# Patient Record
Sex: MALE | Race: BLACK OR AFRICAN AMERICAN | Employment: FULL TIME | URBAN - METROPOLITAN AREA
[De-identification: names, ages, dates, MRNs, and addresses within clinical notes are randomized per-mention and may not be internally consistent; named-entity substitution may affect disease eponyms.]

---

## 2022-01-06 ENCOUNTER — HOSPITAL ENCOUNTER (EMERGENCY)
Age: 27
Discharge: HOME | End: 2022-01-06
Payer: MEDICAID

## 2022-01-06 VITALS
SYSTOLIC BLOOD PRESSURE: 127 MMHG | RESPIRATION RATE: 16 BRPM | TEMPERATURE: 98 F | BODY MASS INDEX: 22.35 KG/M2 | HEART RATE: 74 BPM | WEIGHT: 165 LBS | HEIGHT: 72 IN | DIASTOLIC BLOOD PRESSURE: 66 MMHG | OXYGEN SATURATION: 99 %

## 2022-01-06 DIAGNOSIS — K12.0 APHTHOUS ULCER: ICD-10-CM

## 2022-01-06 DIAGNOSIS — K04.7 DENTAL ABSCESS: Primary | ICD-10-CM

## 2022-01-06 PROCEDURE — 99283 EMERGENCY DEPT VISIT LOW MDM: CPT | Performed by: NURSE PRACTITIONER

## 2022-01-06 RX ORDER — HYDROCODONE BITARTRATE AND ACETAMINOPHEN 5; 325 MG/1; MG/1
1-2 TABLET ORAL EVERY 6 HOURS PRN
Qty: 10 TABLET | Refills: 0 | Status: SHIPPED | OUTPATIENT
Start: 2022-01-06 | End: 2022-01-11

## 2022-01-06 RX ORDER — LIDOCAINE HYDROCHLORIDE 20 MG/ML
5 SOLUTION OROPHARYNGEAL
Qty: 100 ML | Refills: 0 | Status: SHIPPED | OUTPATIENT
Start: 2022-01-06

## 2022-01-06 RX ORDER — AMOXICILLIN AND CLAVULANATE POTASSIUM 875; 125 MG/1; MG/1
1 TABLET, FILM COATED ORAL 2 TIMES DAILY
Qty: 20 TABLET | Refills: 0 | Status: SHIPPED | OUTPATIENT
Start: 2022-01-06 | End: 2022-01-16

## 2022-01-07 NOTE — ED PROVIDER NOTES
Patient Seen in: [facility] Emergency Department In [address] [place] [address]      History   Patient presents with:  Dental Problem    Stated Complaint: bump on gum, mouth pain     Subjective:   23-year-old male presents to the emergency department for sore to his bottom g Mental Status: He is alert and oriented to person, place, and time. ED Course   Labs Reviewed - No data to display                MDM        Aphthous ulcer noted to the inner gum on the lower jaw.   Gingival swelling noted concern for early ab

## 2023-03-06 ENCOUNTER — HOSPITAL ENCOUNTER (EMERGENCY)
Age: 28
Discharge: HOME | End: 2023-03-06
Attending: EMERGENCY MEDICINE
Payer: MEDICAID

## 2023-03-06 VITALS
BODY MASS INDEX: 23.1 KG/M2 | TEMPERATURE: 97 F | HEART RATE: 67 BPM | WEIGHT: 165 LBS | HEIGHT: 71 IN | RESPIRATION RATE: 17 BRPM | OXYGEN SATURATION: 98 % | DIASTOLIC BLOOD PRESSURE: 79 MMHG | SYSTOLIC BLOOD PRESSURE: 130 MMHG

## 2023-03-06 DIAGNOSIS — L84 CORN OF TOE: Primary | ICD-10-CM

## 2023-03-06 DIAGNOSIS — Z20.2 CHLAMYDIA CONTACT: ICD-10-CM

## 2023-03-06 PROCEDURE — 87591 N.GONORRHOEAE DNA AMP PROB: CPT | Performed by: NURSE PRACTITIONER

## 2023-03-06 PROCEDURE — 99283 EMERGENCY DEPT VISIT LOW MDM: CPT

## 2023-03-06 PROCEDURE — 87491 CHLMYD TRACH DNA AMP PROBE: CPT | Performed by: NURSE PRACTITIONER

## 2023-03-06 RX ORDER — DOXYCYCLINE HYCLATE 100 MG/1
100 CAPSULE ORAL 2 TIMES DAILY
Qty: 14 CAPSULE | Refills: 0 | Status: SHIPPED | OUTPATIENT
Start: 2023-03-06 | End: 2023-03-13

## 2023-03-06 NOTE — DISCHARGE INSTRUCTIONS
Follow-up with your primary care physician for any other concerns. Over-the-counter corn and callus remover's will help with your toe.   Use as directed, this can take several weeks to resolve  Start oral antibiotics for chlamydia encounter, we will notify you for any other medications needed

## 2023-03-07 LAB
C TRACH DNA SPEC QL NAA+PROBE: NEGATIVE
N GONORRHOEA DNA SPEC QL NAA+PROBE: NEGATIVE

## 2023-06-04 ENCOUNTER — HOSPITAL ENCOUNTER (EMERGENCY)
Age: 28
Discharge: HOME | End: 2023-06-04
Payer: MEDICAID

## 2023-06-04 VITALS
BODY MASS INDEX: 23.03 KG/M2 | SYSTOLIC BLOOD PRESSURE: 134 MMHG | HEIGHT: 72 IN | RESPIRATION RATE: 12 BRPM | OXYGEN SATURATION: 98 % | DIASTOLIC BLOOD PRESSURE: 69 MMHG | WEIGHT: 170 LBS | HEART RATE: 79 BPM | TEMPERATURE: 98 F

## 2023-06-04 DIAGNOSIS — Z20.2 STD EXPOSURE: ICD-10-CM

## 2023-06-04 DIAGNOSIS — R30.0 DYSURIA: Primary | ICD-10-CM

## 2023-06-04 PROCEDURE — 87491 CHLMYD TRACH DNA AMP PROBE: CPT | Performed by: PHYSICIAN ASSISTANT

## 2023-06-04 PROCEDURE — 99283 EMERGENCY DEPT VISIT LOW MDM: CPT

## 2023-06-04 PROCEDURE — 87591 N.GONORRHOEAE DNA AMP PROB: CPT | Performed by: PHYSICIAN ASSISTANT

## 2023-06-04 PROCEDURE — 99284 EMERGENCY DEPT VISIT MOD MDM: CPT

## 2023-06-04 PROCEDURE — 96372 THER/PROPH/DIAG INJ SC/IM: CPT

## 2023-06-04 RX ORDER — DOXYCYCLINE HYCLATE 100 MG/1
100 CAPSULE ORAL 2 TIMES DAILY
Qty: 14 CAPSULE | Refills: 0 | Status: SHIPPED | OUTPATIENT
Start: 2023-06-04 | End: 2023-06-11

## 2023-06-04 RX ORDER — LIDOCAINE HYDROCHLORIDE 10 MG/ML
INJECTION, SOLUTION INFILTRATION; PERINEURAL
Status: COMPLETED
Start: 2023-06-04 | End: 2023-06-04

## 2023-06-04 RX ORDER — CEFTRIAXONE 500 MG/1
500 INJECTION, POWDER, FOR SOLUTION INTRAMUSCULAR; INTRAVENOUS ONCE
Status: COMPLETED | OUTPATIENT
Start: 2023-06-04 | End: 2023-06-04

## 2023-06-04 NOTE — ED INITIAL ASSESSMENT (HPI)
Was informed by partner that they have an std and pt requesting to be treated for it.   Denies urinary sx

## 2023-06-06 LAB
C TRACH DNA SPEC QL NAA+PROBE: POSITIVE
N GONORRHOEA DNA SPEC QL NAA+PROBE: NEGATIVE

## 2023-06-09 NOTE — ED NOTES
Unable to reach patient after multiple attempts. Certified letter to be sent to patient address listed in EMR to inform of results and appropriate treatments and to notify his partners.

## 2024-01-02 ENCOUNTER — HOSPITAL ENCOUNTER (EMERGENCY)
Age: 29
Discharge: HOME | End: 2024-01-02
Attending: EMERGENCY MEDICINE
Payer: MEDICAID

## 2024-01-02 VITALS
SYSTOLIC BLOOD PRESSURE: 122 MMHG | OXYGEN SATURATION: 98 % | BODY MASS INDEX: 25.9 KG/M2 | DIASTOLIC BLOOD PRESSURE: 70 MMHG | TEMPERATURE: 98 F | WEIGHT: 185 LBS | HEART RATE: 67 BPM | HEIGHT: 71 IN | RESPIRATION RATE: 17 BRPM

## 2024-01-02 DIAGNOSIS — Z20.2 EXPOSURE TO SEXUALLY TRANSMITTED DISEASE (STD): Primary | ICD-10-CM

## 2024-01-02 PROCEDURE — 87591 N.GONORRHOEAE DNA AMP PROB: CPT | Performed by: EMERGENCY MEDICINE

## 2024-01-02 PROCEDURE — 87491 CHLMYD TRACH DNA AMP PROBE: CPT | Performed by: EMERGENCY MEDICINE

## 2024-01-02 PROCEDURE — 96372 THER/PROPH/DIAG INJ SC/IM: CPT

## 2024-01-02 PROCEDURE — 99283 EMERGENCY DEPT VISIT LOW MDM: CPT

## 2024-01-02 PROCEDURE — 99284 EMERGENCY DEPT VISIT MOD MDM: CPT

## 2024-01-02 RX ORDER — DOXYCYCLINE HYCLATE 100 MG/1
100 CAPSULE ORAL 2 TIMES DAILY
Qty: 14 CAPSULE | Refills: 0 | Status: SHIPPED | OUTPATIENT
Start: 2024-01-02 | End: 2024-01-09

## 2024-01-02 RX ORDER — CEFTRIAXONE 500 MG/1
500 INJECTION, POWDER, FOR SOLUTION INTRAMUSCULAR; INTRAVENOUS ONCE
Status: COMPLETED | OUTPATIENT
Start: 2024-01-02 | End: 2024-01-02

## 2024-01-02 NOTE — ED PROVIDER NOTES
Patient Seen in: [facility] In [place]      History     Chief Complaint   Patient presents with    Eval-G     Stated Complaint: STD treatment; states his partner is positive    Subjective:   HPI    Patient tells me that starting yesterday, he began having some burning with urination.  Patient states that he had received oral sex from a female prior to this.  He states that that person went and saw provider because of some sort of symptoms and was treated for gonorrhea and chlamydia but patient himself is unsure if she actually tested positive for either or both of these diseases.  Patient denies any blisters or sores of the genitals.  No sores in the mouth or lips.  No fever.  No flank pain.  No abdominal pain.  No rashes.    Objective:   History reviewed. No pertinent past medical history.           Past Surgical History:   Procedure Laterality Date    WRIST FRACTURE SURGERY Bilateral                 Social History     Socioeconomic History    Marital status: Single   Tobacco Use    Smoking status: Some Days     Types: Cigarettes    Smokeless tobacco: Never   Vaping Use    Vaping Use: Never used   Substance and Sexual Activity    Alcohol use: Yes     Comment: socailly    Drug use: Yes     Types: Cannabis     Comment: occasionally              Review of Systems    Positive for stated complaint: STD treatment; states his partner is positive  Other systems are as noted in HPI.  Constitutional and vital signs reviewed.      All other systems reviewed and negative except as noted above.    Physical Exam     ED Triage Vitals [01/02/24 1524]   /70   Pulse 67   Resp 17   Temp 97.5 °F (36.4 °C)   Temp src    SpO2 98 %   O2 Device None (Room air)       Current:/70   Pulse 67   Temp 97.5 °F (36.4 °C)   Resp 17   Ht 180.3 cm (5' 11\")   Wt 83.9 kg   SpO2 98%   BMI 25.80 kg/m²         Physical Exam  General: The patient is awake, alert, conversant.   Eyes: sclera white, conjunctiva pink  and moist.  Lids and lashes are normal.  Throat: Posterior pharynx is normal.  Oromucosa and tongue without any lesions  Abdomen: Soft, nondistended.  Completely nontender  There is normal male external genitalia.  Penis is circumcised.  No vesicular lesions or ulcerations are noted.  Scrotum is nonerythematous and nonswollen.  Testicles are freely mobile  Skin: Unremarkable  Neurologic:  Mental status as above.  Patient moves all extremities with good strength          ED Course     Labs Reviewed   CHLAMYDIA/GONOCOCCUS, PURVI                      MDM      Patient with STD exposure.  I will treat for both gonorrhea and chlamydia  I discussed with patient that routinely, HIV, syphilis, and hepatitis testing are not performed.  Patient states he has plans to follow-up with his primary care provider.  He may also follow-up with the [facility].  I would recommend repeat testing to ensure any eradication of infections  I recommended avoiding sexual contact until cleared by his primary care doctor or [facility] department.  Patient treated with Rocephin here.  A prescription for doxycycline was sent to pharmacy and I discussed the importance of completing the course of treatment of this medication                                     Medical Decision Making      Disposition and Plan     Clinical Impression:  1. Exposure to sexually transmitted disease (STD)         Disposition:  Discharge  1/2/2024  5:35 pm    Follow-up:  Puneet Scott MD  [address] [place] [id number]  [phone]    Call  As needed          Medications Prescribed:  Current Discharge Medication List        START taking these medications    Details   doxycycline 100 MG Oral Cap Take 1 capsule (100 mg total) by mouth 2 (two) times daily for 7 days.  Qty: 14 capsule, Refills: 0

## 2024-01-02 NOTE — DISCHARGE INSTRUCTIONS
Contact your primary care doctor or your [facility] Department in the morning to arrange follow-up and retesting to ensure eradication of any infection possibly found  Complete the course of antibiotic as prescribed  No sexual contact until you have followed up and are cleared of any contagious disease

## 2024-01-02 NOTE — ED INITIAL ASSESSMENT (HPI)
Coming for STD treatment. States he had a partner that is positive but they did not say with what.

## 2024-01-03 LAB
C TRACH DNA SPEC QL NAA+PROBE: NEGATIVE
N GONORRHOEA DNA SPEC QL NAA+PROBE: NEGATIVE

## 2024-05-05 ENCOUNTER — HOSPITAL ENCOUNTER (EMERGENCY)
Age: 29
Discharge: HOME | End: 2024-05-05
Attending: EMERGENCY MEDICINE

## 2024-05-05 VITALS
DIASTOLIC BLOOD PRESSURE: 71 MMHG | HEIGHT: 72 IN | HEART RATE: 77 BPM | OXYGEN SATURATION: 97 % | RESPIRATION RATE: 16 BRPM | WEIGHT: 185 LBS | TEMPERATURE: 98 F | BODY MASS INDEX: 25.06 KG/M2 | SYSTOLIC BLOOD PRESSURE: 142 MMHG

## 2024-05-05 DIAGNOSIS — A64 STI (SEXUALLY TRANSMITTED INFECTION): Primary | ICD-10-CM

## 2024-05-05 PROCEDURE — 87086 URINE CULTURE/COLONY COUNT: CPT | Performed by: EMERGENCY MEDICINE

## 2024-05-05 PROCEDURE — 96372 THER/PROPH/DIAG INJ SC/IM: CPT

## 2024-05-05 PROCEDURE — 99283 EMERGENCY DEPT VISIT LOW MDM: CPT

## 2024-05-05 PROCEDURE — 87591 N.GONORRHOEAE DNA AMP PROB: CPT | Performed by: EMERGENCY MEDICINE

## 2024-05-05 PROCEDURE — 87491 CHLMYD TRACH DNA AMP PROBE: CPT | Performed by: EMERGENCY MEDICINE

## 2024-05-05 RX ORDER — DOXYCYCLINE HYCLATE 100 MG/1
100 CAPSULE ORAL ONCE
Status: COMPLETED | OUTPATIENT
Start: 2024-05-05 | End: 2024-05-05

## 2024-05-05 RX ORDER — DOXYCYCLINE HYCLATE 100 MG/1
100 CAPSULE ORAL 2 TIMES DAILY
Qty: 14 CAPSULE | Refills: 0 | Status: SHIPPED | OUTPATIENT
Start: 2024-05-05 | End: 2024-05-12

## 2024-05-06 LAB
C TRACH DNA SPEC QL NAA+PROBE: NEGATIVE
N GONORRHOEA DNA SPEC QL NAA+PROBE: POSITIVE

## 2024-05-06 NOTE — ED PROVIDER NOTES
Patient Seen in: [facility] In [place]      History     Chief Complaint   Patient presents with    Eval-G     Stated Complaint: STI treatment    Subjective:   HPI    28-year-old male presents to the emergency department for empiric treatment of sexually transmitted infection.  Patient states that he was recently exposed to a partner who tested positive for gonorrhea and chlamydia.  Patient states that he is had both of these infections in the past.  He claims that he was using condoms but received the infection from oral sex.  He denies any fevers or chills.  Denies any burning with urination.  He states he has some slight penile discharge.      Objective:   History reviewed. No pertinent past medical history.           Past Surgical History:   Procedure Laterality Date    Wrist fracture surgery Bilateral                 Social History     Socioeconomic History    Marital status: Single   Tobacco Use    Smoking status: Some Days     Types: Cigarettes    Smokeless tobacco: Never   Vaping Use    Vaping status: Never Used   Substance and Sexual Activity    Alcohol use: Yes     Comment: socailly    Drug use: Yes     Types: Cannabis     Comment: occasionally     Social Determinants of Health      [facility] from [employer] [employer]    [facility]              Review of Systems    Positive for stated complaint: STI treatment  Other systems are as noted in HPI.  Constitutional and vital signs reviewed.      All other systems reviewed and negative except as noted above.    Physical Exam     ED Triage Vitals [05/05/24 2249]   /71   Pulse 77   Resp 16   Temp 98.3 °F (36.8 °C)   Temp src    SpO2 97 %   O2 Device None (Room air)       Current:/71   Pulse 77   Temp 98.3 °F (36.8 °C)   Resp 16   Ht 182.9 cm (6')   Wt 83.9 kg   SpO2 97%   BMI 25.09 kg/m²         Physical Exam     General: Alert and oriented. No acute distress.  HEENT: Normocephalic. No evidence of trauma. Extraocular  movements are intact.  Cardiovascular exam: Regular rate and rhythm  Lungs: Clear to auscultation bilaterally.  Abdomen: Soft, nondistended, nontender.  Extremities: No evidence of deformity. No clubbing or cyanosis.  Neuro: No focal deficit is noted.    ED Course     Labs Reviewed   CHLAMYDIA/GONOCOCCUS, PURVI   URINE CULTURE, ROUTINE     Patient clinically appears well.  Patient is requesting empiric treatment without knowing any results.  A sample will be sent for testing for gonorrhea and chlamydia.  Patient was administered Rocephin 500 mg IM.  He was given doxycycline 100 mg p.o.  Patient will be discharged home with doxycycline.  Recommend follow-up with his primary care doctor.         MDM      Patient was screened and evaluated during this visit.   As a treating physician attending to the patient, I determined, within reasonable clinical confidence and prior to discharge, that an emergency medical condition was not or was no longer present.  There was no indication for further evaluation, treatment or admission on an emergency basis.  Comprehensive verbal and written discharge and follow-up instructions were provided to help prevent relapse or worsening.  Patient was instructed to follow-up with her primary care provider for further evaluation and treatment, but to return immediately to the ER for worsening, concerning, new, changing or persisting symptoms.  I discussed the case with the patient and they had no questions, complaints, or concerns.  Patient felt comfortable going home.    ^^Please note that this report has been produced using speech recognition software and may contain errors related to that system including, but not limited to, errors in grammar, punctuation, and spelling, as well as words and phrases that possibly may have been recognized inappropriately.  If there are any questions or concerns, contact the dictating provider for clarification                                   Medical  Decision Making      Disposition and Plan     Clinical Impression:  1. STI (sexually transmitted infection)         Disposition:  Discharge  5/5/2024 11:11 pm    Follow-up:  [facility] in [address] [address]  [phone]  Follow up  As needed, If symptoms worsen          Medications Prescribed:  Current Discharge Medication List        START taking these medications    Details   doxycycline 100 MG Oral Cap Take 1 capsule (100 mg total) by mouth 2 (two) times daily for 7 days.  Qty: 14 capsule, Refills: 0

## 2024-05-06 NOTE — DISCHARGE INSTRUCTIONS
Follow-up with your primary care doctor  Take doxycycline 100 mg twice a day for 7 days  Return to the emergency department for any worsening symptoms or new concerns

## 2024-05-06 NOTE — ED INITIAL ASSESSMENT (HPI)
Pt reports coming in due to wanting STI testing. Pt states when he woke up today he noticed slight penile discharge; off white in color per pt. Denies pain.

## 2024-05-10 NOTE — ED NOTES
Unable to reach patient after multiple attempts and messages left. Patient was treated appropriately in the ED for positive culture but will send certified letter to patient address listed in EMR to inform of results so patient can inform partners to be treated.

## 2024-08-09 ENCOUNTER — HOSPITAL ENCOUNTER (EMERGENCY)
Age: 29
Discharge: HOME | End: 2024-08-09
Attending: EMERGENCY MEDICINE
Payer: MEDICAID

## 2024-08-09 VITALS
HEART RATE: 62 BPM | SYSTOLIC BLOOD PRESSURE: 121 MMHG | WEIGHT: 175 LBS | RESPIRATION RATE: 12 BRPM | DIASTOLIC BLOOD PRESSURE: 81 MMHG | OXYGEN SATURATION: 97 % | BODY MASS INDEX: 24.5 KG/M2 | HEIGHT: 71 IN | TEMPERATURE: 97 F

## 2024-08-09 DIAGNOSIS — Z20.2 EXPOSURE TO GONORRHEA: Primary | ICD-10-CM

## 2024-08-09 DIAGNOSIS — Z20.2 POSSIBLE EXPOSURE TO STI: ICD-10-CM

## 2024-08-09 DIAGNOSIS — R36.9 PENILE DISCHARGE: ICD-10-CM

## 2024-08-09 LAB
BILIRUB UR QL STRIP.AUTO: NEGATIVE
CLARITY UR REFRACT.AUTO: CLEAR
COLOR UR AUTO: YELLOW
GLUCOSE UR STRIP.AUTO-MCNC: NEGATIVE MG/DL
KETONES UR STRIP.AUTO-MCNC: NEGATIVE MG/DL
NITRITE UR QL STRIP.AUTO: NEGATIVE
PH UR STRIP.AUTO: 5.5 [PH] (ref 5–8)
PROT UR STRIP.AUTO-MCNC: NEGATIVE MG/DL
RBC UR QL AUTO: NEGATIVE
SP GR UR STRIP.AUTO: >=1.03 (ref 1–1.03)
UROBILINOGEN UR STRIP.AUTO-MCNC: 0.2 MG/DL

## 2024-08-09 PROCEDURE — 99284 EMERGENCY DEPT VISIT MOD MDM: CPT

## 2024-08-09 PROCEDURE — 87591 N.GONORRHOEAE DNA AMP PROB: CPT | Performed by: NURSE PRACTITIONER

## 2024-08-09 PROCEDURE — 96372 THER/PROPH/DIAG INJ SC/IM: CPT

## 2024-08-09 PROCEDURE — 87491 CHLMYD TRACH DNA AMP PROBE: CPT | Performed by: NURSE PRACTITIONER

## 2024-08-09 PROCEDURE — 81015 MICROSCOPIC EXAM OF URINE: CPT | Performed by: NURSE PRACTITIONER

## 2024-08-09 PROCEDURE — 81001 URINALYSIS AUTO W/SCOPE: CPT | Performed by: NURSE PRACTITIONER

## 2024-08-09 PROCEDURE — 99283 EMERGENCY DEPT VISIT LOW MDM: CPT

## 2024-08-09 PROCEDURE — 87086 URINE CULTURE/COLONY COUNT: CPT | Performed by: NURSE PRACTITIONER

## 2024-08-09 RX ORDER — DOXYCYCLINE HYCLATE 100 MG/1
100 CAPSULE ORAL 2 TIMES DAILY
Qty: 14 CAPSULE | Refills: 0 | Status: SHIPPED | OUTPATIENT
Start: 2024-08-09 | End: 2024-08-16

## 2024-08-09 RX ORDER — CEFTRIAXONE 500 MG/1
500 INJECTION, POWDER, FOR SOLUTION INTRAMUSCULAR; INTRAVENOUS ONCE
Status: COMPLETED | OUTPATIENT
Start: 2024-08-09 | End: 2024-08-09

## 2024-08-09 NOTE — DISCHARGE INSTRUCTIONS
Please use condoms every time, for all forms of sexual contact  You are being treated for possible gonorrhea and chlamydia.  You still need to be tested for HIV, syphilis, hepatitis C.  You should have your titers checked for hepatitis B to make sure you are immune.  You are at high risk of long-term issues if you continue to be exposed and contracted sexually transmitted infections.  You may develop drug resistance which means that you will not be treatable with routine medications.  This can lead to multiorgan failure, impotence, and death.  Return for any problems

## 2024-08-09 NOTE — ED PROVIDER NOTES
Patient Seen in: [facility] In [place]      History     Chief Complaint   Patient presents with    Eval-G     Stated Complaint: STD treatment    Subjective:   28-year-old male presents to the emergency department for STI testing and treatment.  Patient states he had a urine specimen done at his doctor's office that came up positive; however he could not get into see his doctor today to get treatment so was told to come to the ER.  Patient states he was told by his current partner that she tested positive for gonorrhea.  He has been having a small amount of discharge from his penis.  He denies any fever, chills, nausea, vomiting, abdominal pain, flank pain, penile swelling, scrotal swelling, or dysuria.    The history is provided by the patient.         Objective:   Past Medical History:    STI (sexually transmitted infection)              Past Surgical History:   Procedure Laterality Date    Wrist fracture surgery Bilateral                 Social History     Socioeconomic History    Marital status: Single   Tobacco Use    Smoking status: Never    Smokeless tobacco: Never   Vaping Use    Vaping status: Never Used   Substance and Sexual Activity    Alcohol use: Yes     Comment: socailly    Drug use: Yes     Types: Cannabis     Comment: several times a week     Social Determinants of Health      Received from [employer], [employer]    [facility]              Review of Systems   Constitutional: Negative.    Gastrointestinal: Negative.  Negative for abdominal pain, diarrhea, nausea and vomiting.   Genitourinary:  Positive for penile discharge. Negative for decreased urine volume, dysuria, frequency, hematuria, penile pain, testicular pain and urgency.   All other systems reviewed and are negative.      Positive for stated Chief Complaint: Eval-G    Other systems are as noted in HPI.  Constitutional and vital signs reviewed.      All other systems reviewed and negative except as noted  above.    Physical Exam     ED Triage Vitals [08/09/24 1422]   /81   Pulse 62   Resp 12   Temp 97 °F (36.1 °C)   Temp src    SpO2 97 %   O2 Device None (Room air)       Current Vitals:   Vital Signs  BP: 121/81  Pulse: 62  Resp: 12  Temp: 97 °F (36.1 °C)    Oxygen Therapy  SpO2: 97 %  O2 Device: None (Room air)            Physical Exam  Vitals and nursing note reviewed.   Constitutional:       General: He is not in acute distress.     Appearance: Normal appearance. He is normal weight. He is not ill-appearing.   HENT:      Head: Normocephalic and atraumatic.      Nose: Nose normal.      Mouth/Throat:      Mouth: Mucous membranes are moist.      Pharynx: Oropharynx is clear.   Eyes:      Conjunctiva/sclera: Conjunctivae normal.      Pupils: Pupils are equal, round, and reactive to light.   Cardiovascular:      Rate and Rhythm: Normal rate and regular rhythm.      Pulses: Normal pulses.      Heart sounds: Normal heart sounds.   Pulmonary:      Effort: Pulmonary effort is normal. No respiratory distress.      Breath sounds: Normal breath sounds.   Abdominal:      General: Abdomen is flat. Bowel sounds are normal.      Palpations: Abdomen is soft.      Tenderness: There is no abdominal tenderness. There is no right CVA tenderness or left CVA tenderness.   Musculoskeletal:         General: Normal range of motion.   Skin:     General: Skin is warm and dry.      Capillary Refill: Capillary refill takes less than 2 seconds.   Neurological:      General: No focal deficit present.      Mental Status: He is alert and oriented to person, place, and time.   Psychiatric:         Mood and Affect: Mood normal.         Behavior: Behavior normal.           ED Course     Labs Reviewed   URINALYSIS WITH CULTURE REFLEX   CHLAMYDIA/GONOCOCCUS, PURVI                    MDM                 Medical Decision Making  27 yo male with possible STI here for treatment and testing.  UA, GC/chlamydia, and Rocephin IM ordered.     Amount and/or  Complexity of Data Reviewed  External Data Reviewed: labs.     Details: Attempted to search for urine specimen that was collected by patient's PCP; however, there is no record in Care Everywhere of this.  Will need to reorder this and patient to give urine specimen while in the department.    Risk  Prescription drug management.        Disposition and Plan     Clinical Impression:  1. Exposure to gonorrhea    2. Possible exposure to STI    3. Penile discharge         Disposition:  Discharge  8/9/2024  3:31 pm    Follow-up:  No follow-up provider specified.        Medications Prescribed:  Current Discharge Medication List        START taking these medications    Details   doxycycline 100 MG Oral Cap Take 1 capsule (100 mg total) by mouth 2 (two) times daily for 7 days.  Qty: 14 capsule, Refills: 0

## 2024-08-12 LAB
C TRACH DNA SPEC QL NAA+PROBE: NEGATIVE
N GONORRHOEA DNA SPEC QL NAA+PROBE: POSITIVE

## 2024-08-19 NOTE — ED NOTES
Unable to reach patient after multiple attempts. Certified letter sent to home address listed in EMR to inform of results and that he was properly treated in the ED but should inform his partner of the results.